# Patient Record
Sex: FEMALE | Race: WHITE | NOT HISPANIC OR LATINO | Employment: FULL TIME | URBAN - NONMETROPOLITAN AREA
[De-identification: names, ages, dates, MRNs, and addresses within clinical notes are randomized per-mention and may not be internally consistent; named-entity substitution may affect disease eponyms.]

---

## 2017-03-02 ENCOUNTER — OFFICE VISIT (OUTPATIENT)
Dept: OPHTHALMOLOGY | Facility: CLINIC | Age: 42
End: 2017-03-02

## 2017-03-02 DIAGNOSIS — H15.101 EPISCLERITIS, RIGHT: Primary | ICD-10-CM

## 2017-03-02 PROBLEM — H15.109 EPISCLERITIS: Status: ACTIVE | Noted: 2017-03-02

## 2017-03-02 PROCEDURE — 99202 OFFICE O/P NEW SF 15 MIN: CPT | Performed by: OPHTHALMOLOGY

## 2017-03-02 RX ORDER — PREDNISOLONE ACETATE 10 MG/ML
1 SUSPENSION/ DROPS OPHTHALMIC 4 TIMES DAILY
Qty: 1 EACH | Refills: 0 | Status: SHIPPED | OUTPATIENT
Start: 2017-03-02 | End: 2017-03-16

## 2017-03-02 NOTE — PROGRESS NOTES
Subjective   Mireille Varner is a 41 y.o. female.   Chief Complaint   Patient presents with   • Eye Pain     HPI     Eye Pain   Laterality: In right eye   Quality: burning and pain with eye movement   Pain scale: 6/10   Frequency: constantly   Timing: in the morning   Duration: 1 day           Comments   Red burning OD x 24 hours, no dc; wears SCL       Last edited by Thai López MD on 3/2/2017  4:07 PM. (History)          Review of Systems   Eyes: Positive for pain and redness.       Objective   Visual Acuity (Snellen - Linear)      Right Left   Dist cc 20/25 20/20       Correction:  Glasses               Pupils      Pupils   Right PERRL   Left PERRL            Not recorded         Extraocular Movement      Right Left   Result Full Full              Tonometry (Applanation, 4:09 PM)      Right Left   Pressure 18               Main Ophthalmology Exam     External Exam      Right Left    External Normal Normal      Slit Lamp Exam      Right Left    Lids/Lashes Normal Normal    Conjunctiva/Sclera 2+ Injection temp White and quiet    Cornea Clear Clear    Anterior Chamber Deep and quiet Deep and quiet    Iris Round and reactive Round and reactive    Lens Clear Clear    Vitreous Normal Normal      Fundus Exam      Right Left    Disc Normal Normal    Macula Normal Normal    Vessels Normal Normal                Assessment/Plan   Diagnoses and all orders for this visit:    Episcleritis, right  -     prednisoLONE acetate (OMNIPRED) 1 % ophthalmic suspension; Administer 1 drop to the right eye 4 (Four) Times a Day for 14 days.    PF qid x 1 wk, then bid x 1 wk, no CL wear       Return if symptoms worsen or fail to improve.

## 2020-02-25 DIAGNOSIS — Z12.39 SCREENING FOR BREAST CANCER: Primary | ICD-10-CM

## 2020-02-26 ENCOUNTER — OFFICE VISIT (OUTPATIENT)
Dept: OBSTETRICS AND GYNECOLOGY | Facility: CLINIC | Age: 45
End: 2020-02-26

## 2020-02-26 VITALS
BODY MASS INDEX: 29.96 KG/M2 | WEIGHT: 179.8 LBS | HEIGHT: 65 IN | DIASTOLIC BLOOD PRESSURE: 70 MMHG | SYSTOLIC BLOOD PRESSURE: 106 MMHG

## 2020-02-26 DIAGNOSIS — Z12.4 ENCOUNTER FOR PAPANICOLAOU SMEAR FOR CERVICAL CANCER SCREENING: Primary | ICD-10-CM

## 2020-02-26 PROCEDURE — 87624 HPV HI-RISK TYP POOLED RSLT: CPT | Performed by: OBSTETRICS & GYNECOLOGY

## 2020-02-26 PROCEDURE — 99396 PREV VISIT EST AGE 40-64: CPT | Performed by: OBSTETRICS & GYNECOLOGY

## 2020-02-26 PROCEDURE — G0123 SCREEN CERV/VAG THIN LAYER: HCPCS | Performed by: OBSTETRICS & GYNECOLOGY

## 2020-02-26 NOTE — PROGRESS NOTES
"Chief complaint here for Pap smear    SUBJECTIVE:   44 y.o. female for annual routine Pap and checkup.  No current outpatient medications on file.     No current facility-administered medications for this visit.      Allergies: Patient has no known allergies.   No LMP recorded.    ROS:  Feeling well. No dyspnea or chest pain on exertion.  No abdominal pain, change in bowel habits, black or bloody stools.  No urinary tract symptoms. GYN ROS: normal menses, no abnormal bleeding, pelvic pain or discharge, no breast pain or new or enlarging lumps on self exam. No neurological complaints.    OBJECTIVE:   The patient appears well, alert, oriented x 3, in no distress.  /70 (BP Location: Left arm, Patient Position: Sitting, Cuff Size: Adult)   Ht 165.1 cm (65\")   Wt 81.6 kg (179 lb 12.8 oz)   BMI 29.92 kg/m²   ENT normal.  Neck supple. No adenopathy or thyromegaly. JUAN ALBERTO. Lungs are clear, good air entry, no wheezes, rhonchi or rales. S1 and S2 normal, no murmurs, regular rate and rhythm. Abdomen soft without tenderness, guarding, mass or organomegaly. Extremities show no edema, normal peripheral pulses. Neurological is normal, no focal findings.    BREAST EXAM: not examined    PELVIC EXAM: normal external genitalia, vulva, vagina, cervix, uterus and adnexa    ASSESSMENT:   well woman    PLAN:   mammogram  pap smear  If Pap smear and HPV negative repeat 5 years  "

## 2020-02-28 LAB
GEN CATEG CVX/VAG CYTO-IMP: NORMAL
LAB AP CASE REPORT: NORMAL
LAB AP GYN ADDITIONAL INFORMATION: NORMAL
LAB AP GYN OTHER FINDINGS: NORMAL
PATH INTERP SPEC-IMP: NORMAL
STAT OF ADQ CVX/VAG CYTO-IMP: NORMAL

## 2020-03-04 LAB — HPV I/H RISK 4 DNA CVX QL PROBE+SIG AMP: NEGATIVE

## 2020-07-02 RX ORDER — PREDNISOLONE ACETATE 10 MG/ML
1 SUSPENSION/ DROPS OPHTHALMIC 4 TIMES DAILY
Qty: 1 EACH | Refills: 11 | OUTPATIENT
Start: 2020-07-02 | End: 2021-05-24

## 2021-03-26 RX ORDER — CIPROFLOXACIN 500 MG/1
500 TABLET, FILM COATED ORAL 2 TIMES DAILY
Qty: 6 TABLET | Refills: 1 | OUTPATIENT
Start: 2021-03-26 | End: 2021-05-24

## 2022-07-25 ENCOUNTER — OFFICE VISIT (OUTPATIENT)
Dept: CARDIOLOGY | Facility: CLINIC | Age: 47
End: 2022-07-25

## 2022-07-25 VITALS
BODY MASS INDEX: 29.79 KG/M2 | HEART RATE: 71 BPM | HEIGHT: 65 IN | OXYGEN SATURATION: 100 % | DIASTOLIC BLOOD PRESSURE: 80 MMHG | WEIGHT: 178.8 LBS | SYSTOLIC BLOOD PRESSURE: 118 MMHG

## 2022-07-25 DIAGNOSIS — R07.89 OTHER CHEST PAIN: ICD-10-CM

## 2022-07-25 DIAGNOSIS — R94.31 ABNORMAL EKG: ICD-10-CM

## 2022-07-25 DIAGNOSIS — R00.2 PALPITATIONS: Primary | ICD-10-CM

## 2022-07-25 PROCEDURE — 93000 ELECTROCARDIOGRAM COMPLETE: CPT | Performed by: INTERNAL MEDICINE

## 2022-07-25 PROCEDURE — 99205 OFFICE O/P NEW HI 60 MIN: CPT | Performed by: NURSE PRACTITIONER

## 2022-07-25 RX ORDER — AMOXICILLIN AND CLAVULANATE POTASSIUM 875; 125 MG/1; MG/1
1 TABLET, FILM COATED ORAL
COMMUNITY
Start: 2022-07-15 | End: 2022-07-26

## 2022-07-25 NOTE — PROGRESS NOTES
"Abnormal ECG (Chief complaint) and Palpitations    Palpitations   Associated symptoms include chest pain, an irregular heartbeat and malaise/fatigue. Pertinent negatives include no coughing, diaphoresis, dizziness, fever, nausea, near-syncope, shortness of breath, vomiting or weakness.     Mrs. Mireille Varner is a 47 year old female with medication history of hypertension, former smoker and arthritis.  She is being referred by KD Urbano for palpitations and abnormal EKG.      Reports that she noticed a thump\" during her pregnancy many years ago.  She developed high blood pressure during pregnancy but has not had hypertension since the feeling of fluttering off and on through the years.  Four weeks ago she said that she felt the thumping the whole day.  She works as a .  Fluttering started while she was sitting.  Sometimes she will have a headache during and after the episodes.  She will feel tired after the thumping goes away.  At times she will have a dull ache in her chest and described as \"tooth ache.  Over the past 2 weeks she has been more fatigued and tired.  At times she develops throat and neck pain.  She used to exercise daily with her daughter but has since quit exercising due to dumping and fluttering in her chest.  She consumes coffee, 2 cups in the morning and 2 more in the afternoon and drinks about 32 ounces of water daily.       Family history with mother having CHF due to non-Hodgkin's lymphoma treatment.  Father has hypertension and MI.  Of CHF and hypertension    EKG from Ms. Dooley's office shows normal sinus rhythm with low voltage QRS possible septal infarct.  Lab work from PCPs office shows significant CMP with normal limits, TSH, magnesium 2.1.  Urinalysis with moderate leukocytes, negative chest x-ray.    The 10-year ASCVD risk score (Olamide CALI Jr., et al., 2013) is: 0.5%    Values used to calculate the score:      Age: 47 years      Sex: Female      Is Non- " : No      Diabetic: No      Tobacco smoker: No      Systolic Blood Pressure: 118 mmHg      Is BP treated: No      HDL Cholesterol: 71 mg/dL      Total Cholesterol: 155 mg/dL    Cardiac Risk Factors:  Sedentary life style  Comments family history of CAD    History reviewed. No pertinent past medical history.  Past Surgical History:   Procedure Laterality Date   • ENDOMETRIAL ABLATION     • INGUINAL HERNIA REPAIR       Social History     Socioeconomic History   • Marital status:    Tobacco Use   • Smoking status: Never Smoker   • Smokeless tobacco: Never Used   Substance and Sexual Activity   • Alcohol use: Never   • Drug use: Never   • Sexual activity: Yes     Family History   Problem Relation Age of Onset   • Hypertension Mother    • Lymphoma Mother    • COPD Father    • Hypertension Father    • Heart attack Father        ALLERGIES:  No Known Allergies      Review of Systems   Constitutional: Positive for malaise/fatigue. Negative for diaphoresis, fever and night sweats.   HENT: Negative for odynophagia and sore throat.    Eyes: Negative for blurred vision and double vision.   Cardiovascular: Positive for chest pain, irregular heartbeat and palpitations. Negative for dyspnea on exertion, leg swelling, near-syncope, orthopnea and paroxysmal nocturnal dyspnea.   Respiratory: Negative for cough, shortness of breath and wheezing.    Endocrine: Negative for polydipsia.   Hematologic/Lymphatic: Negative for adenopathy and bleeding problem.   Skin: Negative for poor wound healing and rash.   Musculoskeletal: Positive for neck pain. Negative for muscle cramps, muscle weakness and myalgias.   Gastrointestinal: Negative for bloating, abdominal pain, nausea and vomiting.   Genitourinary: Negative for flank pain and hematuria.        UTI, on RX   Neurological: Positive for headaches. Negative for dizziness, focal weakness and weakness.       Current Outpatient Medications   Medication Sig Dispense Refill  "  • amoxicillin-clavulanate (AUGMENTIN) 875-125 MG per tablet Take 1 tablet by mouth.     • metoprolol tartrate (LOPRESSOR) 25 MG tablet Take 1 tablet by mouth Take As Directed. 1 tablet the night before and morning of CT scan 2 tablet 0     No current facility-administered medications for this visit.       OBJECTIVE:    Physical Exam:   Vitals reviewed.   Constitutional:       Appearance: Healthy appearance. Overweight and not in distress.   Eyes:      General: Lids are normal.      Conjunctiva/sclera: Conjunctivae normal.      Right eye: Right conjunctiva is not injected.      Left eye: Left conjunctiva is not injected.      Pupils: Pupils are equal, round, and reactive to light.   HENT:      Head: Normocephalic and atraumatic.   Neck:      Thyroid: No thyromegaly.      Vascular: No JVD.      Trachea: Trachea normal.   Pulmonary:      Effort: Pulmonary effort is normal.      Breath sounds: Normal breath sounds and air entry. No wheezing. No rhonchi.   Cardiovascular:      PMI at left midclavicular line. Normal rate. Regular rhythm. Normal S1. Normal S2.      Murmurs: There is no murmur.      No gallop.   Edema:     Peripheral edema absent.   Skin:     General: Skin is warm and dry.      Capillary Refill: Capillary refill takes less than 2 seconds.      Coloration: Skin is not jaundiced or pale.   Neurological:      General: No focal deficit present.      Mental Status: Alert, oriented to person, place, and time and oriented to person, place and time.   Psychiatric:         Attention and Perception: Attention normal.         Mood and Affect: Mood normal.         Behavior: Behavior is cooperative.         Thought Content: Thought content normal.       Vitals:    07/25/22 1519   BP: 118/80   BP Location: Right arm   Patient Position: Sitting   Cuff Size: Adult   Pulse: 71   SpO2: 100%   Weight: 81.1 kg (178 lb 12.8 oz)   Height: 165.1 cm (65\")       DATA REVIEWED:       XR Chest 2 View    Result Date: 7/14/2022   "  Negative [id number]    CXR:     CT:     Labs: BMP, CBC, LIPID, TSH  Lab Results   Component Value Date    CALCIUM 9.2 07/14/2022     07/14/2022    K 4.4 07/14/2022    CO2 29 07/14/2022     07/14/2022    BUN 15 07/14/2022    CREATININE 0.9 07/14/2022     No results found for: WBC, HGB, HCT, MCV, PLT  No results found for: CHOL  Lab Results   Component Value Date    TRIG 44 07/14/2022     Lab Results   Component Value Date    HDL 71 07/14/2022     No components found for: LDLCALC  Lab Results   Component Value Date    LDL 75 07/14/2022     No results found for: HDLLDLRATIO  No components found for: CHOLHDL  Lab Results   Component Value Date    TSH 2.09 07/14/2022     No results found for: PROBNP  EKG:           The following portions of the patient's history were reviewed and updated as appropriate: allergies, current medications, past family history, past medical history, past social history, past surgical history and problem list.  Old records reviewed and pertinent information is included in the above objective data.     ASSESSMENT/PLAN:       Diagnosis Plan   1. Palpitations  ECG 12 Lead    metoprolol tartrate (LOPRESSOR) 25 MG tablet    Adult Transthoracic Echo Complete w/ Color, Spectral and Contrast if Necessary Per Protocol    Mobile Cardiac Outpatient Telemetry   2. Abnormal EKG  ECG 12 Lead    metoprolol tartrate (LOPRESSOR) 25 MG tablet    CT Angiogram Coronary    Adult Transthoracic Echo Complete w/ Color, Spectral and Contrast if Necessary Per Protocol     1.  Palpitations.  No abnormality seen on physical exam today.  EKG obtained shows normal sinus rhythm no ectopy.  She does describing fluttering and thumping in her chest suggestive of cardiac ectopy.  She is feeling more tired and fatigued.  She formally was an exerciser and has now been sedentary since her symptoms have worsened.    -Place [employer] Holter monitor today. Monitor for 10-14 days to assess for arrhythmias such as SVT,  atrial tachycardia, or other ectopy.   -Obtain echocardiogram to assess for structural abnormalities such as valvular dysfunction and abnormal wall motion.    2.  Abnormal EKG.  EKG at Ms. Miah YI's office showed sinus rhythm, low voltage QRS with possible septal infarct.  EKG obtained in office today shows normal sinus rhythm at 71 bpm with low voltage QRS.  No acute ST changes or T wave abnormality seen.    -We will obtain CTA of coronary arteries.  We discussed risk as radiation and dye exposure.  Potential dye reaction.  Benefit of CTA is visualization of the coronaries and calcification if present.  She is agreeable to proceed.  Premed will be given to her to take the night before and the day of her CTA.  Lopressor 25 mg the night before and the morning of her test.  Discussed that this will bring her heart rate down for more accuracy wiith test.    I spent 60 minutes caring for Mireille on this date of service. This time includes time spent by me in the following activities: preparing for the visit, reviewing tests, obtaining and/or reviewing a separately obtained history, performing a medically appropriate examination and/or evaluation, counseling and educating the patient/family/caregiver, ordering medications, tests, or procedures, documenting information in the medical record, independently interpreting results and communicating that information with the patient/family/caregiver and care coordination  Return in about 6 weeks (around 9/5/2022) for Recheck.  We will call her with results of above testing.          This document has been electronically signed by KD Aguero on July 25, 2022 16:20 CDT   Electronically signed by KD Aguero, 07/25/22, 1:12 PM CDT.

## 2022-07-31 LAB
QT INTERVAL: 372 MS
QTC INTERVAL: 404 MS

## 2022-08-09 ENCOUNTER — TELEPHONE (OUTPATIENT)
Dept: CARDIOLOGY | Facility: CLINIC | Age: 47
End: 2022-08-09

## 2022-08-09 NOTE — TELEPHONE ENCOUNTER
Contacted PT per Estela Stark to let her know that the CTA was approved by insurance.  PT was unavailable and left detailed vm.

## 2022-08-15 ENCOUNTER — TELEPHONE (OUTPATIENT)
Dept: CARDIOLOGY | Facility: CLINIC | Age: 47
End: 2022-08-15

## 2022-08-15 NOTE — TELEPHONE ENCOUNTER
Patient returned call to office. Informed her per Estela:  her holter monitor was a relatively benign study. The majority of the time your were  in a normal sinus rhythm. You are having some extra beat but noting sustained.   Average HR 75, minimum HR while asleep was in the 40's. Maximum HR was 134.   The triggered events correlated with normal rhythm. We will discuss more at the next office visit    She voiced her understanding.               Contacted patient, per Estela:  her holter monitor was a relatively benign study. The majority of the time your were  in a normal sinus rhythm. You are having some extra beat but noting sustained.   Average HR 75, minimum HR while asleep was in the 40's. Maximum HR was 134.   The triggered events correlated with normal rhythm. We will discuss more at the next office visit    Patient was not available so left generic  for return call to office.         ----- Message from KD Aguero sent at 8/15/2022 12:46 PM CDT -----  Please call the patient regarding her holter monitor results. It was read as a relatively benign study. The majority of the time your were  in a normal sinus rhythm. You are having some extra beat but noting sustained.     Average HR 75, minimum HR while asleep was in the 40's. Maximum HR was 134.     The triggered events correlated with normal rhythm. We will discuss more at the next office visit

## 2022-08-18 ENCOUNTER — HOSPITAL ENCOUNTER (OUTPATIENT)
Dept: CT IMAGING | Facility: HOSPITAL | Age: 47
Discharge: HOME | End: 2022-08-18
Admitting: NURSE PRACTITIONER

## 2022-08-18 PROCEDURE — 75574 CT ANGIO HRT W/3D IMAGE: CPT

## 2022-08-18 PROCEDURE — 0 IOPAMIDOL PER 1 ML: Performed by: NURSE PRACTITIONER

## 2022-08-18 RX ORDER — SODIUM CHLORIDE 9 MG/ML
100 INJECTION, SOLUTION INTRAVENOUS
Status: COMPLETED | OUTPATIENT
Start: 2022-08-18 | End: 2022-08-18

## 2022-08-18 RX ADMIN — IOPAMIDOL 90 ML: 755 INJECTION, SOLUTION INTRAVENOUS at 08:56

## 2022-08-18 RX ADMIN — SODIUM CHLORIDE 71 ML: 9 INJECTION, SOLUTION INTRAVENOUS at 08:56

## 2022-09-12 ENCOUNTER — OFFICE VISIT (OUTPATIENT)
Dept: CARDIOLOGY | Facility: CLINIC | Age: 47
End: 2022-09-12

## 2022-09-12 VITALS
SYSTOLIC BLOOD PRESSURE: 124 MMHG | HEART RATE: 72 BPM | WEIGHT: 177.4 LBS | BODY MASS INDEX: 29.56 KG/M2 | HEIGHT: 65 IN | OXYGEN SATURATION: 98 % | DIASTOLIC BLOOD PRESSURE: 78 MMHG

## 2022-09-12 DIAGNOSIS — R00.2 PALPITATIONS: Primary | ICD-10-CM

## 2022-09-12 DIAGNOSIS — R94.31 ABNORMAL EKG: ICD-10-CM

## 2022-09-12 PROCEDURE — 99214 OFFICE O/P EST MOD 30 MIN: CPT | Performed by: NURSE PRACTITIONER

## 2022-09-12 NOTE — PROGRESS NOTES
"Palpitations    Palpitations   Associated symptoms include chest pain, an irregular heartbeat and malaise/fatigue. Pertinent negatives include no coughing, diaphoresis, dizziness, nausea, near-syncope, shortness of breath or weakness.     Mrs. Mireille Varner is a 47 year old female with medication history of hypertension, former smoker and arthritis.  She is being referred by KD Urbano for palpitations and abnormal EKG.      Reports that she noticed a thump\" during her pregnancy many years ago.  She developed high blood pressure during pregnancy but has not had hypertension since the feeling of fluttering off and on through the years.  Four weeks ago she said that she felt the thumping the whole day.  She works as a .  Fluttering started while she was sitting.  Sometimes she will have a headache during and after the episodes.  She will feel tired after the thumping goes away.  At times she will have a dull ache in her chest and described as \"tooth ache.  Over the past 2 weeks she has been more fatigued and tired.  At times she develops throat and neck pain.  She used to exercise daily with her daughter but has since quit exercising due to dumping and fluttering in her chest.  She consumes coffee, 2 cups in the morning and 2 more in the afternoon and drinks about 32 ounces of water daily.       Family history with mother having CHF due to non-Hodgkin's lymphoma treatment.  Father has hypertension and MI.  Of CHF and hypertension    EKG from Ms. Dooley's office shows normal sinus rhythm with low voltage QRS possible septal infarct.  Lab work from PCPs office shows significant CMP with normal limits, TSH, magnesium 2.1.  Urinalysis with moderate leukocytes, negative chest x-ray.    Today, she presents for follow-up regarding palpitations.  She has no reoccurrence of palpitations.  Has had some chest pain but is relieved to find that her cardiac work-up has been negative.     The 10-year ASCVD risk " score (Olamide LEIVA Jr., et al., 2013) is: 0.5%    Values used to calculate the score:      Age: 47 years      Sex: Female      Is Non- : No      Diabetic: No      Tobacco smoker: No      Systolic Blood Pressure: 124 mmHg      Is BP treated: No      HDL Cholesterol: 71 mg/dL      Total Cholesterol: 155 mg/dL    Cardiac Risk Factors:  Sedentary life style  Comments family history of CAD    History reviewed. No pertinent past medical history.  Past Surgical History:   Procedure Laterality Date   • ENDOMETRIAL ABLATION     • INGUINAL HERNIA REPAIR       Social History     Socioeconomic History   • Marital status:    Tobacco Use   • Smoking status: Never Smoker   • Smokeless tobacco: Never Used   Substance and Sexual Activity   • Alcohol use: Never   • Drug use: Never   • Sexual activity: Yes     Family History   Problem Relation Age of Onset   • Hypertension Mother    • Lymphoma Mother    • COPD Father    • Hypertension Father    • Heart attack Father        ALLERGIES:  No Known Allergies      Review of Systems   Constitutional: Positive for malaise/fatigue. Negative for diaphoresis.   Eyes: Negative for blurred vision and double vision.   Cardiovascular: Positive for chest pain and irregular heartbeat. Negative for dyspnea on exertion, leg swelling, near-syncope, orthopnea and paroxysmal nocturnal dyspnea.   Respiratory: Negative for cough and shortness of breath.    Musculoskeletal: Negative for muscle cramps, muscle weakness and myalgias.   Gastrointestinal: Negative for abdominal pain and nausea.   Neurological: Negative for dizziness and weakness.       No current outpatient medications on file.     No current facility-administered medications for this visit.       OBJECTIVE:    Physical Exam:   Vitals reviewed.   Constitutional:       Appearance: Healthy appearance. Overweight and not in distress.   Eyes:      General: Lids are normal.      Conjunctiva/sclera: Conjunctivae normal.       "Right eye: Right conjunctiva is not injected.      Left eye: Left conjunctiva is not injected.      Pupils: Pupils are equal, round, and reactive to light.   HENT:      Head: Normocephalic and atraumatic.   Neck:      Thyroid: No thyromegaly.      Vascular: No JVD.      Trachea: Trachea normal.   Pulmonary:      Effort: Pulmonary effort is normal.      Breath sounds: Normal breath sounds and air entry. No wheezing. No rhonchi.   Cardiovascular:      PMI at left midclavicular line. Normal rate. Regular rhythm. Normal S1. Normal S2.      Murmurs: There is no murmur.      No gallop.   Edema:     Peripheral edema absent.   Skin:     General: Skin is warm and dry.      Capillary Refill: Capillary refill takes less than 2 seconds.      Coloration: Skin is not jaundiced or pale.   Neurological:      General: No focal deficit present.      Mental Status: Alert, oriented to person, place, and time and oriented to person, place and time.   Psychiatric:         Attention and Perception: Attention normal.         Mood and Affect: Mood normal.         Behavior: Behavior is cooperative.         Thought Content: Thought content normal.       Vitals:    09/12/22 1536   BP: 124/78   BP Location: Left arm   Patient Position: Sitting   Cuff Size: Adult   Pulse: 72   SpO2: 98%   Weight: 80.5 kg (177 lb 6.4 oz)   Height: 165.1 cm (65\")       DATA REVIEWED:   Results for orders placed in visit on 09/07/22    Adult Transthoracic Echo Complete w/ Color, Spectral and Contrast if Necessary Per Protocol    Interpretation Summary  · Calculated left ventricular 3D EF = 55% Estimated left ventricular EF = 58% Left ventricular ejection fraction appears to be 56 - 60%. Left ventricular systolic function is normal.  · Estimated right ventricular systolic pressure from tricuspid regurgitation is normal (<35 mmHg).  · Left ventricular diastolic function was normal.  · There is no evidence of pericardial effusion.      No radiology results for the " last 30 days.  CXR:     CT:     Labs: BMP, CBC, LIPID, TSH  Lab Results   Component Value Date    CALCIUM 9.2 07/14/2022     07/14/2022    K 4.4 07/14/2022    CO2 29 07/14/2022     07/14/2022    BUN 15 07/14/2022    CREATININE 0.9 07/14/2022     No results found for: WBC, HGB, HCT, MCV, PLT  No results found for: CHOL  Lab Results   Component Value Date    TRIG 44 07/14/2022     Lab Results   Component Value Date    HDL 71 07/14/2022     No components found for: LDLCALC  Lab Results   Component Value Date    LDL 75 07/14/2022     No results found for: HDLLDLRATIO  No components found for: CHOLHDL  Lab Results   Component Value Date    TSH 2.09 07/14/2022     No results found for: PROBNP  EKG:           The following portions of the patient's history were reviewed and updated as appropriate: allergies, current medications, past family history, past medical history, past social history, past surgical history and problem list.  Old records reviewed and pertinent information is included in the above objective data.     ASSESSMENT/PLAN:     Diagnosis Plan   1. Palpitations     2. Abnormal EKG       1.  Palpitations.  Holter monitor was worn for 13 days, and 16 hours.  Maximum heart rate 134 bpm, minimum heart rate 48 bpm and average heart rate 74 bpm.  She did have premature ventricular contractions and premature atrial contractions.  These were less than 1% of her overall heart rate.  The majority of the time she is normal sinus rhythm.  During sleep hours she has normal diurnal  pattern.      -I encouraged her to start back exercising as this relieves her stress.  I encouraged her to drink water throughout the day and to stay hydrated.    2.  Abnormal EKG.  EKG at Ms. Miah YI's office showed sinus rhythm, low voltage QRS with possible septal infarct.  EKG at last visit showed normal sinus rhythm at 71 bpm with low voltage QRS.  No acute ST changes or T wave abnormality seen.  We obtained a CTA of her  coronary arteries which had calcium score of 0 and no identifiable arthrosclerosis or plaquing noted.   Echocardiogram showed LVEF at 58% normal diastolic function, minimal tricuspid regurg noted normal RV systolic pressure.    I spent 22 minutes caring for Mireille on this date of service. This time includes time spent by me in the following activities: reviewing tests, obtaining and/or reviewing a separately obtained history and documenting information in the medical record  Return if symptoms worsen or fail to improve.        Dictation completed on 9/22/2022  This document has been electronically signed by KD Aguero on September 12, 2022 15:49 CDT   Electronically signed by KD Aguero, 09/12/22, 3:49 PM CDT.